# Patient Record
(demographics unavailable — no encounter records)

---

## 2024-11-01 NOTE — PHYSICAL EXAM
[General Appearance - Alert] : alert [General Appearance - In No Acute Distress] : in no acute distress [Sclera] : the sclera and conjunctiva were normal [Outer Ear] : the ears and nose were normal in appearance [Jugular Venous Distention Increased] : there was no jugular-venous distention [Auscultation Breath Sounds / Voice Sounds] : lungs were clear to auscultation bilaterally [Heart Sounds] : normal S1 and S2 [Heart Sounds Gallop] : no gallops [Edema] : there was no peripheral edema [Bowel Sounds] : normal bowel sounds [Abdomen Soft] : soft [No CVA Tenderness] : no ~M costovertebral angle tenderness [Nail Clubbing] : no clubbing  or cyanosis of the fingernails [] : no rash [No Focal Deficits] : no focal deficits [Oriented To Time, Place, And Person] : oriented to person, place, and time [Affect] : the affect was normal [Mood] : the mood was normal [FreeTextEntry1] : Nguyễn catheter in place- connected with uro bag

## 2024-11-01 NOTE — HISTORY OF PRESENT ILLNESS
[FreeTextEntry1] : Pt is a 66-year-old man with PMHx of obesity on Mounjaro (has lost 70lbs in 1 year), BPH (on [place]), chronic joint pains/neuropathic pain (takes ibuprofen almost daily) and recent hospitalization who came to the clinic for the initial evaluation of NICOLE.    Pt was recently admitted at [facility] (10/12-10/17)for abnormal outpatient labs that revealed Scr elevated at 13mg/dl up from 1.1mg/dl in June 2024. Was taking 600 mg ibuprofen 1-2 times per day most days of the week for the past few years for neuropathic pains and chronic joint pains.  He went to his PMD's office for further evaluation of constipation, had routine labs done which showed NICOLE (BUN/Cr 146/13.63, K 6.0). In the ED, CT scan abd/pelvis showing bladder distension, bilateral hydronephrosis, creatinine 14mg/dl, serum potassium elevated at 5.8, with serum bicarb low at 12. He had lake inserted with immediate return of 1L of urine. Further work up showed C3, C4, ANCA, HAILEY, anti-GBM, HIV, Hep B, Hep C- unremarkable. Subsequently Scr improved to 6.84 (10/17). Pt was discharged with lake catheter.   Pt subsequently was seen by Dr. Ирина Granado (Nephrologist) and Dr. Myles Chavarria. Per Pt, last Scr improved further to 4 (10/23/24).  Pt. Denies history of kidney stones in the past. No history of kidney disease in family. Denies recent use of NSAIDs/ motrin recently.   Pt. currently feels well. Pt. denies any chest pain, SOB, nausea, dysuria, weakness.

## 2024-11-01 NOTE — ASSESSMENT
[FreeTextEntry1] : 1.NICOLE: Pt with NICOLE secondary to obstructive uropathy and bilateral hydronephrosis during recent hospitalization. Pt was recently admitted at [facility] (10/12-10/17)for abnormal outpatient labs that revealed Scr elevated at 13mg/dl up from 1.1mg/dl in June 2024. Was taking 600 mg ibuprofen 1-2 times per day most days of the week for the past few years for neuropathic pains and chronic joint pains. In the ED, CT scan abd/pelvis showing bladder distension, bilateral hydronephrosis, creatinine 14mg/dl, serum potassium elevated at 5.8, with serum bicarb low at 12. He had lake inserted with immediate return of 1L of urine. Further work up showed C3, C4, ANCA, HAILEY, anti-GBM, HIV, Hep B, Hep C- unremarkable. Received IVFs. Subsequently Scr improved to 6.84 (10/17). Pt was discharged with lake catheter. last Scr improved further to 4 (10/23/24).  Advised to drink plenty of fluids ~2L per day. Advised to follow with Dr. Chavarria regarding lake catheter care. Will repeat renal panel and CBC. Advised patient to avoid NSAIDs, RCAs, and other nephrotoxins. Monitor renal function.  Follow up pending lab results.   40 minutes: spent in obtaining and reviewing previous records, performing the visit, counseling/coordination of care, creating orders, and documenting the encounter.

## 2024-11-01 NOTE — REVIEW OF SYSTEMS
[As Noted in HPI] : as noted in HPI [Fever] : no fever [Chills] : no chills [Feeling Poorly] : not feeling poorly [Dry Eyes] : no dryness of the eyes [Sore Throat] : no sore throat [Chest Pain] : no chest pain [Palpitations] : no palpitations [Lower Ext Edema] : no extremity edema [Cough] : no cough [SOB on Exertion] : no shortness of breath during exertion [Abdominal Pain] : no abdominal pain [Dysuria] : no dysuria [Limb Pain] : no limb pain [Itching] : no itching [Dizziness] : no dizziness [Fainting] : no fainting [Anxiety] : no anxiety [Muscle Weakness] : no muscle weakness

## 2025-02-06 NOTE — ASSESSMENT
[FreeTextEntry1] : 1.NICOLE: Pt with NICOLE secondary to obstructive uropathy and bilateral hydronephrosis during recent hospitalization (admitted at [facility] from 10/12-10/17) for NICOLE. In the ED, CT scan abd/pelvis showing bladder distension, bilateral hydronephrosis. scr was elevated at 14. He had lake catheterization with immediate return of 1L of urine. Further work up showed C3, C4, ANCA, HAILEY, anti-GBM, HIV, Hep B, Hep C- unremarkable. Received IVFs. Subsequently Scr improved to 6.84 (10/17). Pt was discharged with lake catheter. Pt now s/p aqua-ablation prostate surgery done on 12/2/24. last Scr remained elevated / improved to 2.51 on 2/5/25. Advised to drink plenty of fluids ~2L per day. Will repeat renal panel, UA in 6 weeks. Advised patient to avoid NSAIDs, RCAs, and other nephrotoxins. Monitor renal function.  Follow up in 6 weeks.   30 minutes: spent in obtaining and reviewing previous records, performing the visit, counseling/coordination of care, creating orders, and documenting the encounter.

## 2025-02-06 NOTE — HISTORY OF PRESENT ILLNESS
[FreeTextEntry1] : Pt is a 66-year-old man with PMHx of obesity on Mounjaro (has lost 70lbs in 1 year), BPH (on Saw [place]), chronic joint pains/neuropathic pain (takes ibuprofen almost daily) and recent hospitalization who came to the clinic for follow up visit. Pt was last seen for the initial evaluation of NICOLE on 10/31/24.     Kidney history: Pt was admitted at [facility] (10/12/24-10/17/24) for abnormal outpatient labs that revealed Scr elevated at 13mg/dl up from 1.1mg/dl in June 2024. He was taking 600 mg ibuprofen 1-2 times per day most days of the week for the past few years for neuropathic pains and chronic joint pains. In the ED, CT scan abd/pelvis showing bladder distension, bilateral hydronephrosis, creatinine 14mg/dl, serum potassium elevated at 5.8, with serum bicarb low at 12. He had lake catheterization with immediate return of 1L of urine. Further work up showed C3, C4, ANCA, HAILEY, anti-GBM, HIV, Hep B, Hep C- unremarkable. Subsequently Scr improved to 6.84 (10/17). Pt was discharged with lake catheter.  Pt eventually had lake catheter removed in mid-November. He received oral antibiotics for UTI. Subsequently, he underwent aqua-ablation for enlarged prostate by Dr. Myles Chavarria (Urologist) on 12/2/24. Later he developed UTI ~3 episodes. Last Scr remained elevated/ improved to 2.51 on 2/5/25. Last UA done on 2/5/25 showed 15 WBCs.    Pt. currently feels well. Pt. denies any chest pain, SOB, nausea, dysuria, weakness.

## 2025-03-27 NOTE — HISTORY OF PRESENT ILLNESS
[FreeTextEntry1] : Pt is a 66-year-old man with PMHx of obesity on Mounjaro (has lost 70lbs in 1 year), BPH (on Saw [place]), chronic joint pains/neuropathic pain (was taking Ibuprofen almost daily) and recent hospitalization who came to the clinic for follow up visit. Pt was last seen for follow up visit for NICOLE on 2/6/25.   Kidney history: Pt was admitted at [facility] (10/12/24-10/17/24) for abnormal outpatient labs that revealed Scr elevated at 13mg/dl up from 1.1mg/dl in June 2024. He was taking 600 mg ibuprofen 1-2 times per day most days of the week for the past few years for neuropathic pains and chronic joint pains. In the ED, CT scan abd/pelvis showing bladder distension, bilateral hydronephrosis, creatinine 14mg/dl, serum potassium elevated at 5.8, with serum bicarb low at 12. He had lake catheterization with immediate return of 1L of urine. Further work up showed C3, C4, ANCA, HAILEY, anti-GBM, HIV, Hep B, Hep C- unremarkable. Subsequently Scr improved to 6.84 (10/17). Pt was discharged with lake catheter.  Pt eventually had lake catheter removed in mid-November. He received oral antibiotics for UTI. Subsequently, he underwent aqua-ablation for enlarged prostate by Dr. Myles Chavarria (Urologist) on 12/2/24. Later he developed UTI ~3 episodes. Subsequently, Scr remained elevated/ improved to 2.51 on 2/5/25. UA done on 2/5/25 showed 15 WBCs.   Recent Scr remained stable at 2.47 on 3/26/25. Recent UPCR was WNL at 0.2 on 3/26/25.   Pt. currently feels well. Pt. denies any chest pain, SOB, nausea, dysuria, weakness. Pt says he is urinating well. No hematuria or any urinary symptoms.

## 2025-03-27 NOTE — ASSESSMENT
[FreeTextEntry1] : 1.CKD stage4: Pt with CKD secondary to NICOLE. Pt had NICOLE secondary to obstructive uropathy and bilateral hydronephrosis during recent hospitalization (admitted at [facility] from 10/12-10/17). In the ED, CT scan abd/pelvis showing bladder distension, bilateral hydronephrosis. Scr was elevated at 14. He had lake catheterization with immediate return of 1L of urine. Further work up showed C3, C4, ANCA, HAILEY, anti-GBM, HIV, Hep B, Hep C- unremarkable. Received IVFs. Subsequently Scr improved to 6.84 (10/17). Pt was discharged with lake catheter. Pt now s/p aqua-ablation prostate surgery done on 12/2/24. Recent Scr remained elevated / improved to 2.47 on 3/26/25. Advised to drink plenty of fluids ~2L per day.  Advised patient to avoid NSAIDs, RCAs, and other nephrotoxins. Monitor renal function.  2.Elevated BP readings: Pt says he has white coat syndrome and BP reading s ranges ~ 120-130smmHg at home. Low salt diet advised. Maintain BP recordings log at home.    Follow up in 4-5 months.   30 minutes: spent in obtaining and reviewing previous records, performing the visit, counseling/coordination of care, creating orders, and documenting the encounter.